# Patient Record
Sex: FEMALE | Race: WHITE | NOT HISPANIC OR LATINO | Employment: OTHER | URBAN - METROPOLITAN AREA
[De-identification: names, ages, dates, MRNs, and addresses within clinical notes are randomized per-mention and may not be internally consistent; named-entity substitution may affect disease eponyms.]

---

## 2018-05-23 ENCOUNTER — OFFICE VISIT (OUTPATIENT)
Dept: CARDIOLOGY | Facility: CLINIC | Age: 83
End: 2018-05-23

## 2018-05-23 VITALS
DIASTOLIC BLOOD PRESSURE: 72 MMHG | SYSTOLIC BLOOD PRESSURE: 124 MMHG | BODY MASS INDEX: 28.29 KG/M2 | HEIGHT: 69 IN | HEART RATE: 74 BPM | WEIGHT: 191 LBS

## 2018-05-23 DIAGNOSIS — I48.0 PAROXYSMAL ATRIAL FIBRILLATION (HCC): Primary | ICD-10-CM

## 2018-05-23 DIAGNOSIS — I10 ESSENTIAL HYPERTENSION: ICD-10-CM

## 2018-05-23 PROCEDURE — 93010 ELECTROCARDIOGRAM REPORT: CPT | Performed by: INTERNAL MEDICINE

## 2018-05-23 PROCEDURE — 99203 OFFICE O/P NEW LOW 30 MIN: CPT | Performed by: INTERNAL MEDICINE

## 2018-05-23 RX ORDER — LEVOTHYROXINE SODIUM 0.07 MG/1
75 TABLET ORAL DAILY
COMMUNITY

## 2018-05-23 RX ORDER — OMEPRAZOLE 20 MG/1
20 CAPSULE, DELAYED RELEASE ORAL DAILY
COMMUNITY

## 2018-05-23 RX ORDER — METOPROLOL SUCCINATE 25 MG/1
25 TABLET, EXTENDED RELEASE ORAL DAILY
COMMUNITY
End: 2019-01-11 | Stop reason: SDUPTHER

## 2018-05-23 RX ORDER — FUROSEMIDE 40 MG/1
40 TABLET ORAL DAILY
COMMUNITY
End: 2018-11-14 | Stop reason: SDUPTHER

## 2018-05-23 RX ORDER — ROSUVASTATIN CALCIUM 10 MG/1
10 TABLET, COATED ORAL DAILY
COMMUNITY

## 2018-05-23 NOTE — PROGRESS NOTES
Subjective:     Encounter Date:05/23/2018    Primary Care Physician: Rafia Keenan MD      Patient ID: Carolyn Pina is a 85 y.o. female.    Chief Complaint:Shortness of Breath (consult) and Irregular Heart Beat    PROBLEM LIST:  1. Paroxysmal atrial fibrillation  a. Diagnosed in 2016  b. CHADSVASC- 4, on chronic anticoagulation  c. Amiodarone pulmonary toxicity  2. Hypertension  3. Dyslipidemia  4. 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 On chronic oxygen 3-5L  5. Hypothyroidism  6. Arthritis  7. Surgeries:  a. Total hysterectomy  b. Bilateral cataract extraction  c. Neck lipoma removal.    No Known Allergies      Current Outpatient Prescriptions:   •  furosemide (LASIX) 40 MG tablet, Take 40 mg by mouth Daily., Disp: , Rfl:   •  levothyroxine (SYNTHROID, LEVOTHROID) 75 MCG tablet, Take 75 mcg by mouth Daily., Disp: , Rfl:   •  metoprolol succinate XL (TOPROL-XL) 25 MG 24 hr tablet, Take 25 mg by mouth Daily., Disp: , Rfl:   •  MIRTAZAPINE PO, Take  by mouth As Needed., Disp: , Rfl:   •  omeprazole (priLOSEC) 20 MG capsule, Take 20 mg by mouth Daily., Disp: , Rfl:   •  rivaroxaban (XARELTO) 20 MG tablet, Take 20 mg by mouth Daily., Disp: , Rfl:   •  rosuvastatin (CRESTOR) 10 MG tablet, Take 10 mg by mouth Daily., Disp: , Rfl:         History of Present Illness    Patient is an 85-year-old  female who we are seeing today first option of cardiac care secondary to history of paroxysmal atrial ablation.  She has history of this over the last few years but has had recurrent palpitations for quite some time.  She was previously seen by Dr. Sagar Metzger.  She was placed on amiodarone therapy for her paroxysmal atrial fibrillation and then subsequently developed pulmonary toxicity.  She is now followed by a pulmonologist and is on chronic oxygen therapy.  After this incident she now wishes change care.  She denies any chest pain, pressure, tightness.  She does note chronic shortness of breath.   "Denies any syncope, near-syncope, or edema.  Notes she is simply here first tablet [place] of care.    The following portions of the patient's history were reviewed and updated as appropriate: allergies, current medications, past family history, past medical history, past social history, past surgical history and problem list.    Family history: Reviewed and noncontributory    Social History   Substance Use Topics   • Smoking status: Never Smoker   • Smokeless tobacco: Never Used   • Alcohol use No         Review of Systems   Constitution: Negative for fever, weakness and malaise/fatigue.   HENT: Positive for hearing loss. Negative for nosebleeds.    Eyes: Negative for redness and visual disturbance.   Cardiovascular: Negative for orthopnea, palpitations and paroxysmal nocturnal dyspnea.   Respiratory: Positive for shortness of breath. Negative for cough, snoring, sputum production and wheezing.    Hematologic/Lymphatic: Negative for bleeding problem.   Skin: Negative for flushing, itching and rash.   Musculoskeletal: Positive for arthritis. Negative for falls, joint pain and muscle cramps.   Gastrointestinal: Positive for dysphagia. Negative for abdominal pain, diarrhea, heartburn, nausea and vomiting.   Genitourinary: Positive for urgency. Negative for hematuria.   Neurological: Negative for excessive daytime sleepiness, dizziness, headaches and tremors.   Psychiatric/Behavioral: Negative for substance abuse. The patient is not nervous/anxious.           Objective:    height is 175.3 cm (69\") and weight is 86.6 kg (191 lb). Her blood pressure is 124/72 and her pulse is 74.         Physical Exam   Constitutional: She is oriented to person, place, and time. She appears well-developed and well-nourished.   HENT:   Head: Normocephalic and atraumatic.   Mouth/Throat: Oropharynx is clear and moist.   Eyes: Conjunctivae are normal. Pupils are equal, round, and reactive to light.   Neck: Normal carotid pulses and no JVD " present. Carotid bruit is not present. No thyromegaly present.   Cardiovascular: Normal rate, regular rhythm, S1 normal and S2 normal.  Exam reveals no gallop and no friction rub.    No murmur heard.  Pulses:       Carotid pulses are 2+ on the right side, and 2+ on the left side.       Dorsalis pedis pulses are 2+ on the right side, and 2+ on the left side.        Posterior tibial pulses are 2+ on the right side, and 2+ on the left side.   Pulmonary/Chest: No respiratory distress. She has no wheezes. She has no rales. She exhibits no tenderness.   Abdominal: She exhibits no distension, no abdominal bruit and no mass. There is no hepatosplenomegaly. There is no tenderness. There is no rebound.   Musculoskeletal: She exhibits edema (1+ bilateral edema). She exhibits no tenderness or deformity.   Significant bilateral venous varicosities   Lymphadenopathy:     She has no cervical adenopathy.   Neurological: She is alert and oriented to person, place, and time. She has normal strength.   Skin: Skin is warm and dry. No rash noted. No cyanosis. Nails show no clubbing.   Psychiatric: She has a normal mood and affect. Cognition and memory are normal.         ECG 12 Lead  Date/Time: 5/23/2018 2:14 PM  Performed by: DIPTI HOLLINGSWORTH  Authorized by: DIPTI HOLLINGSWORTH   Rhythm: sinus rhythm  Clinical impression: normal ECG                  Assessment:   Assessment/Plan      Carolyn was seen today for shortness of breath and irregular heart beat.    Diagnoses and all orders for this visit:    Paroxysmal atrial fibrillation    Essential hypertension    Other orders  -     ECG 12 Lead      Patient with paroxysmal atrial fibrillation, 2 symptomatically episodes in the last 2 years.  Was present on amiodarone, however now has amiodarone pulmonary toxicity.  She is currently in sinus rhythm and is at her baseline.  I would recommend lifelong Xarelto (at low dose given her creatinine clearance of 41) discussed options of other medical  therapy/antiarrhythmics with the patient and .  I do not feel that antiarrhythmic is in her best interest given the rare brief nature of her episodes would simply continue her low-dose metoprolol.  In addition given her age, I'm not sure she requires a statin for primary preventative strategies.  I will have her discuss this with her primary care physician, to see if this medicine may be discontinued.       Polly PINEDA scribed portions of this dictation for  Dr. Beebe.  I, Sigifredo Beebe MD, personally performed the services described in this documentation as scribed by the above individual in my presence, and it is both accurate and complete  Dictated utilizing Dragon dictation

## 2018-11-14 ENCOUNTER — TELEPHONE (OUTPATIENT)
Dept: CARDIOLOGY | Facility: CLINIC | Age: 83
End: 2018-11-14

## 2018-11-14 RX ORDER — FUROSEMIDE 40 MG/1
40 TABLET ORAL DAILY
Qty: 90 TABLET | Refills: 2 | Status: SHIPPED | OUTPATIENT
Start: 2018-11-14 | End: 2021-10-20

## 2018-11-14 NOTE — TELEPHONE ENCOUNTER
Family called requesting refill for furosemide which previously was filled by Dr. Metzger.  Is that acceptable or should they get from PCP?

## 2019-01-11 RX ORDER — METOPROLOL SUCCINATE 25 MG/1
25 TABLET, EXTENDED RELEASE ORAL DAILY
Qty: 30 TABLET | Refills: 5 | Status: SHIPPED | OUTPATIENT
Start: 2019-01-11 | End: 2019-07-05 | Stop reason: SDUPTHER

## 2019-01-16 ENCOUNTER — TELEPHONE (OUTPATIENT)
Dept: CARDIOLOGY | Facility: CLINIC | Age: 84
End: 2019-01-16

## 2019-01-16 NOTE — TELEPHONE ENCOUNTER
Son called with concern that patient has been on metoprolol twice daily for years, since being seen by Dr. Metzger, cardiology.  Chart review shows external records have metoprolol bid, Epic chart shows once daily.

## 2019-01-16 NOTE — TELEPHONE ENCOUNTER
Please clarify also if she is on toprol or lopressor. If torpol this is typically prescribed once daily and as long as heart rate and BP are controlled would not change. If lopressor this should be BID.

## 2019-01-17 NOTE — TELEPHONE ENCOUNTER
If BP and HR ok and patient not having ill effects from not taking twice daily would stay on daily dose

## 2019-01-18 NOTE — TELEPHONE ENCOUNTER
Called son, he advised they figured it out, she was on the non extended release before so was BID, understands now is on extended release so will be daily

## 2019-06-05 ENCOUNTER — OFFICE VISIT (OUTPATIENT)
Dept: CARDIOLOGY | Facility: CLINIC | Age: 84
End: 2019-06-05

## 2019-06-05 VITALS
WEIGHT: 196 LBS | DIASTOLIC BLOOD PRESSURE: 66 MMHG | OXYGEN SATURATION: 98 % | HEART RATE: 81 BPM | HEIGHT: 69 IN | SYSTOLIC BLOOD PRESSURE: 140 MMHG | BODY MASS INDEX: 29.03 KG/M2

## 2019-06-05 DIAGNOSIS — I48.0 PAROXYSMAL ATRIAL FIBRILLATION (HCC): Primary | ICD-10-CM

## 2019-06-05 DIAGNOSIS — I10 ESSENTIAL HYPERTENSION: ICD-10-CM

## 2019-06-05 PROCEDURE — 99213 OFFICE O/P EST LOW 20 MIN: CPT | Performed by: NURSE PRACTITIONER

## 2019-06-05 NOTE — PROGRESS NOTES
Subjective:     Encounter Date:06/05/2019    Primary Care Physician: Rafia Keenan MD      Patient ID: Carolyn Pina is a 86 y.o. female.    Chief Complaint:Follow-up    PROBLEM LIST:  1. Paroxysmal atrial fibrillation  a. Diagnosed in 2016  b. CHADSVASC- 4, on chronic anticoagulation  c. Amiodarone pulmonary toxicity  2. Hypertension  3. Dyslipidemia  4. 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 On chronic oxygen 3-5L  5. Hypothyroidism  6. Arthritis  7. Surgeries:  a. Total hysterectomy  b. Bilateral cataract extraction  c. Neck lipoma removal.      Allergies   Allergen Reactions   • Amiodarone Shortness Of Breath     Lung Damage         Current Outpatient Medications:   •  furosemide (LASIX) 40 MG tablet, Take 1 tablet by mouth Daily., Disp: 90 tablet, Rfl: 2  •  levothyroxine (SYNTHROID, LEVOTHROID) 75 MCG tablet, Take 75 mcg by mouth Daily., Disp: , Rfl:   •  metoprolol succinate XL (TOPROL-XL) 25 MG 24 hr tablet, Take 1 tablet by mouth Daily., Disp: 30 tablet, Rfl: 5  •  MIRTAZAPINE PO, Take  by mouth As Needed., Disp: , Rfl:   •  omeprazole (priLOSEC) 20 MG capsule, Take 20 mg by mouth Daily., Disp: , Rfl:   •  rivaroxaban (XARELTO) 20 MG tablet, Take 1 tablet by mouth Daily., Disp: 90 tablet, Rfl: 2  •  rosuvastatin (CRESTOR) 10 MG tablet, Take 10 mg by mouth Daily., Disp: , Rfl:         History of Present Illness    Patient returns today for annual follow-up of paroxysmal atrial fibrillation.  Since last being seen she notes to overall be doing well.  She has not had any tachypalpitations similar to her previous atrial fibrillation.  She denies any chest pain, pressure, tightness.  She denies any increased shortness of breath.  No reported syncope, near-syncope, or edema.  Notes that she has been compliant with her anticoagulation.  Denies any current bleeding issues.  Notes that she had blood work done with her primary care physician roughly 2 weeks ago.    The following portions of the  "patient's history were reviewed and updated as appropriate: allergies, current medications, past family history, past medical history, past social history, past surgical history and problem list.      Social History     Tobacco Use   • Smoking status: Never Smoker   • Smokeless tobacco: Never Used   Substance Use Topics   • Alcohol use: No   • Drug use: No         Review of Systems   Constitution: Positive for weakness.   HENT: Positive for hearing loss.    Cardiovascular: Negative.    Respiratory: Negative.    Hematologic/Lymphatic: Negative for bleeding problem. Does not bruise/bleed easily.   Skin: Negative for rash.   Musculoskeletal: Positive for arthritis. Negative for muscle weakness and myalgias.   Gastrointestinal: Negative for heartburn, nausea and vomiting.          Objective:    height is 175.3 cm (69\") and weight is 88.9 kg (196 lb). Her blood pressure is 140/66 and her pulse is 81. Her oxygen saturation is 98%.         Physical Exam   Constitutional: She is oriented to person, place, and time. She appears well-developed and well-nourished.   Neck: No JVD present. No tracheal deviation present.   Cardiovascular: Normal rate, regular rhythm, normal heart sounds and intact distal pulses. Exam reveals no friction rub.   No murmur heard.  Pulmonary/Chest: Effort normal and breath sounds normal. No respiratory distress.   Abdominal: Soft. Bowel sounds are normal. There is no tenderness.   Musculoskeletal: She exhibits deformity (Mild to moderate kyphosis of spine). She exhibits no edema.   Neurological: She is alert and oriented to person, place, and time.   Skin: Skin is warm and dry.       Procedures          Assessment:   Assessment/Plan      Carolyn was seen today for follow-up.    Diagnoses and all orders for this visit:    Paroxysmal atrial fibrillation (CMS/HCC) stable.  On chronic anticoagulation.    Essential hypertension, controlled.      Plan:  1. Continue Xarelto for anticoagulation purposes of " atrial fibrillation as well as beta-blockade for rate control.  2. Obtain lab work from primary care physician to evaluate Xarelto dosing.  May need to be on 15 mg if GFR less than 50.  3. Continue beta-blocker for hypertension as well.  4. Will contact patient regarding Xarelto dosing after labs reviewed.  5. Follow-up in 1 year's time or sooner if needed.       MIRIAM Llanos     Dictated utilizing Dragon dictation

## 2019-06-06 ENCOUNTER — TELEPHONE (OUTPATIENT)
Dept: CARDIOLOGY | Facility: CLINIC | Age: 84
End: 2019-06-06

## 2019-06-19 ENCOUNTER — TELEPHONE (OUTPATIENT)
Dept: CARDIOLOGY | Facility: CLINIC | Age: 84
End: 2019-06-19

## 2019-06-19 NOTE — TELEPHONE ENCOUNTER
Spoke with son, aroldo Borja has reviewed labs and for patient to remain on current dose of Xarelto 20 mg.   Understanding and agreement verbalized.

## 2019-07-05 RX ORDER — METOPROLOL SUCCINATE 25 MG/1
TABLET, EXTENDED RELEASE ORAL
Qty: 90 TABLET | Refills: 3 | Status: SHIPPED | OUTPATIENT
Start: 2019-07-05 | End: 2020-07-06

## 2019-07-18 RX ORDER — RIVAROXABAN 20 MG/1
TABLET, FILM COATED ORAL
Qty: 90 TABLET | Refills: 2 | Status: SHIPPED | OUTPATIENT
Start: 2019-07-18 | End: 2020-04-09

## 2019-09-03 ENCOUNTER — OFFICE VISIT (OUTPATIENT)
Dept: CARDIOLOGY | Facility: CLINIC | Age: 84
End: 2019-09-03

## 2019-09-03 VITALS
DIASTOLIC BLOOD PRESSURE: 70 MMHG | HEIGHT: 69 IN | OXYGEN SATURATION: 97 % | SYSTOLIC BLOOD PRESSURE: 144 MMHG | HEART RATE: 78 BPM | WEIGHT: 193 LBS | BODY MASS INDEX: 28.58 KG/M2

## 2019-09-03 DIAGNOSIS — E78.00 PURE HYPERCHOLESTEROLEMIA: ICD-10-CM

## 2019-09-03 DIAGNOSIS — R07.2 PRECORDIAL PAIN: Primary | ICD-10-CM

## 2019-09-03 DIAGNOSIS — I10 ESSENTIAL HYPERTENSION: ICD-10-CM

## 2019-09-03 DIAGNOSIS — I48.0 PAROXYSMAL ATRIAL FIBRILLATION (HCC): ICD-10-CM

## 2019-09-03 PROCEDURE — 99213 OFFICE O/P EST LOW 20 MIN: CPT | Performed by: INTERNAL MEDICINE

## 2019-09-03 RX ORDER — HYDROCODONE BITARTRATE AND ACETAMINOPHEN 5; 325 MG/1; MG/1
1 TABLET ORAL DAILY PRN
Refills: 0 | COMMUNITY
Start: 2019-08-19 | End: 2020-11-04

## 2019-09-03 RX ORDER — NITROGLYCERIN 0.4 MG/1
0.4 TABLET SUBLINGUAL
Refills: 0 | COMMUNITY
Start: 2019-08-09

## 2019-09-03 RX ORDER — GLYCOPYRROLATE 25 UG/ML
SOLUTION RESPIRATORY (INHALATION) 2 TIMES DAILY
Refills: 12 | COMMUNITY
Start: 2019-07-25

## 2019-12-23 RX ORDER — FUROSEMIDE 40 MG/1
TABLET ORAL
Qty: 90 TABLET | Refills: 0 | OUTPATIENT
Start: 2019-12-23

## 2020-04-09 RX ORDER — RIVAROXABAN 20 MG/1
TABLET, FILM COATED ORAL
Qty: 90 TABLET | Refills: 0 | Status: SHIPPED | OUTPATIENT
Start: 2020-04-09 | End: 2020-07-20

## 2020-07-06 RX ORDER — METOPROLOL SUCCINATE 25 MG/1
TABLET, EXTENDED RELEASE ORAL
Qty: 90 TABLET | Refills: 0 | Status: SHIPPED | OUTPATIENT
Start: 2020-07-06 | End: 2020-09-30

## 2020-07-20 RX ORDER — RIVAROXABAN 20 MG/1
TABLET, FILM COATED ORAL
Qty: 90 TABLET | Refills: 0 | Status: SHIPPED | OUTPATIENT
Start: 2020-07-20 | End: 2020-10-22

## 2020-09-30 RX ORDER — METOPROLOL SUCCINATE 25 MG/1
TABLET, EXTENDED RELEASE ORAL
Qty: 90 TABLET | Refills: 3 | Status: SHIPPED | OUTPATIENT
Start: 2020-09-30 | End: 2021-09-24

## 2020-10-22 RX ORDER — RIVAROXABAN 20 MG/1
TABLET, FILM COATED ORAL
Qty: 90 TABLET | Refills: 0 | Status: SHIPPED | OUTPATIENT
Start: 2020-10-22 | End: 2020-11-04 | Stop reason: SDUPTHER

## 2020-11-04 ENCOUNTER — OFFICE VISIT (OUTPATIENT)
Dept: CARDIOLOGY | Facility: CLINIC | Age: 85
End: 2020-11-04

## 2020-11-04 VITALS
HEART RATE: 77 BPM | SYSTOLIC BLOOD PRESSURE: 162 MMHG | WEIGHT: 190 LBS | HEIGHT: 69 IN | OXYGEN SATURATION: 96 % | BODY MASS INDEX: 28.14 KG/M2 | DIASTOLIC BLOOD PRESSURE: 70 MMHG

## 2020-11-04 DIAGNOSIS — I48.0 PAROXYSMAL ATRIAL FIBRILLATION (HCC): Primary | ICD-10-CM

## 2020-11-04 DIAGNOSIS — I10 ESSENTIAL HYPERTENSION: ICD-10-CM

## 2020-11-04 DIAGNOSIS — E78.00 PURE HYPERCHOLESTEROLEMIA: ICD-10-CM

## 2020-11-04 PROCEDURE — 99213 OFFICE O/P EST LOW 20 MIN: CPT | Performed by: INTERNAL MEDICINE

## 2020-11-04 NOTE — PROGRESS NOTES
Subjective:     Encounter Date:11/04/2020    Primary Care Physician: Rafia Keenan MD      Patient ID: Carolyn Pina is a 87 y.o. female.    Chief Complaint:Follow-up    PROBLEM LIST:  1. Paroxysmal atrial fibrillation  a. Diagnosed in 2016  b. CHADSVASC- 4, on chronic anticoagulation  c. Amiodarone pulmonary toxicity  2. Hypertension  3. Dyslipidemia  4. 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 On chronic oxygen 3-5L  5. Hypothyroidism  6. Rheumatoid arthritis  7. Surgeries:  a. Total hysterectomy  b. Bilateral cataract extraction  c. Neck lipoma removal.      Allergies   Allergen Reactions   • Amiodarone Shortness Of Breath     Lung Damage         Current Outpatient Medications:   •  furosemide (LASIX) 40 MG tablet, Take 1 tablet by mouth Daily., Disp: 90 tablet, Rfl: 2  •  levothyroxine (SYNTHROID, LEVOTHROID) 75 MCG tablet, Take 75 mcg by mouth Daily., Disp: , Rfl:   •  LONHALA MAGNAIR REFILL KIT 25 MCG/ML solution, 2 (Two) Times a Day. as directed, Disp: , Rfl: 12  •  metoprolol succinate XL (TOPROL-XL) 25 MG 24 hr tablet, Take 1 tablet by mouth once daily, Disp: 90 tablet, Rfl: 3  •  MIRTAZAPINE PO, Take  by mouth As Needed., Disp: , Rfl:   •  nitroglycerin (NITROSTAT) 0.4 MG SL tablet, Place 0.4 mg under the tongue Every 5 (Five) Minutes As Needed. do not exceed a total of 3 doses in 15 minutes, Disp: , Rfl: 0  •  omeprazole (priLOSEC) 20 MG capsule, Take 20 mg by mouth Daily., Disp: , Rfl:   •  rosuvastatin (CRESTOR) 10 MG tablet, Take 10 mg by mouth Daily., Disp: , Rfl:   •  Xarelto 20 MG tablet, Take 1 tablet by mouth once daily, Disp: 90 tablet, Rfl: 0        History of Present Illness    Patient returns today for annual follow-up of atrial fibrillation and hypertension.  Since her last visit she notes from a cardiovascular standpoint she is doing very well.  She has been started on prednisone for rheumatoid arthritis due to significant joint discomfort, notes being on this that she  "feels much better and has been much more active.  She still has some dyspnea on exertion.  Notes some mild edema since being on this.  Thinks her blood pressures have been mildly elevated since he been on the prednisone.  She is in the midst of tapering off this at this time.  No tachypalpitations or A. fib.    The following portions of the patient's history were reviewed and updated as appropriate: allergies, current medications, past family history, past medical history, past social history, past surgical history and problem list.      Social History     Tobacco Use   • Smoking status: Never Smoker   • Smokeless tobacco: Never Used   Substance Use Topics   • Alcohol use: No   • Drug use: No         Review of Systems   Constitution: Positive for chills.   HENT: Positive for ear pain and hearing loss.    Eyes: Positive for blurred vision and visual disturbance.   Cardiovascular: Negative for chest pain, dyspnea on exertion, leg swelling, palpitations and syncope.   Respiratory: Positive for snoring. Negative for shortness of breath.    Hematologic/Lymphatic: Negative for bleeding problem. Does not bruise/bleed easily.   Skin: Negative for rash.   Musculoskeletal: Positive for arthritis, joint pain, joint swelling and stiffness. Negative for muscle weakness and myalgias.   Gastrointestinal: Positive for change in bowel habit and constipation. Negative for heartburn, nausea and vomiting.   Genitourinary: Positive for frequency.   Neurological: Negative for dizziness, light-headedness, loss of balance and numbness.          Objective:   /70 (BP Location: Left arm)   Pulse 77   Ht 175.3 cm (69\")   Wt 86.2 kg (190 lb)   SpO2 96%   BMI 28.06 kg/m²         Vitals signs reviewed.   Constitutional:       Appearance: Well-developed and not in distress.   Neck:      Thyroid: No thyromegaly.      Vascular: No carotid bruit or JVD.   Pulmonary:      Breath sounds: Normal breath sounds.   Cardiovascular:      Regular " rhythm.      No gallop. No S3 and S4 gallop.   Abdominal:      General: Bowel sounds are normal.      Palpations: Abdomen is soft. There is no abdominal mass.      Tenderness: There is no abdominal tenderness.   Musculoskeletal:         General: No deformity.      Extremities: No clubbing present.  Skin:     General: Skin is warm and dry.      Findings: No rash.   Neurological:      Mental Status: Alert and oriented to person, place, and time.         Procedures          Assessment:   Assessment/Plan      Diagnoses and all orders for this visit:    1. Paroxysmal atrial fibrillation (CMS/HCC) (Primary)    2. Essential hypertension    3. Pure hypercholesterolemia      1.  Paroxysmal atrial fibrillation.  No documented recurrence in quite some time.  Currently asymptomatic.  On chronic anticoagulation.  2.  Amiodarone lung toxicity.  Is now off her oxygen during the daytime only needs it at nighttime.  Hypertension elevated due to recent steroid/prednisone usage  4.  Dyslipidemia currently well controlled    Recommendations   1.  Continue current medical therapy  2.  Follow-up blood pressure with primary physician once off steroid taper.  2.  Revisit on an as-needed basis       Sigifredo Beebe MD      Dictated utilizing Dragon dictation

## 2021-08-03 ENCOUNTER — OFFICE VISIT (OUTPATIENT)
Dept: CARDIOLOGY | Facility: CLINIC | Age: 86
End: 2021-08-03

## 2021-08-03 VITALS
HEIGHT: 69 IN | OXYGEN SATURATION: 98 % | BODY MASS INDEX: 29.47 KG/M2 | HEART RATE: 67 BPM | WEIGHT: 199 LBS | DIASTOLIC BLOOD PRESSURE: 70 MMHG | SYSTOLIC BLOOD PRESSURE: 146 MMHG

## 2021-08-03 DIAGNOSIS — I48.0 PAROXYSMAL ATRIAL FIBRILLATION (HCC): Primary | ICD-10-CM

## 2021-08-03 DIAGNOSIS — I10 ESSENTIAL HYPERTENSION: ICD-10-CM

## 2021-08-03 DIAGNOSIS — R60.0 LOCALIZED EDEMA: ICD-10-CM

## 2021-08-03 PROCEDURE — 99214 OFFICE O/P EST MOD 30 MIN: CPT | Performed by: NURSE PRACTITIONER

## 2021-08-03 RX ORDER — METOLAZONE 2.5 MG/1
2.5 TABLET ORAL DAILY
Qty: 2 TABLET | Refills: 0 | Status: SHIPPED | OUTPATIENT
Start: 2021-08-03

## 2021-08-03 NOTE — PROGRESS NOTES
Subjective:     Encounter Date:08/03/2021    Primary Care Physician: Rafia Keenan MD      Patient ID: Carolyn Pina is a 88 y.o. female.    Chief Complaint:Follow-up and Edema (legs/feet)    PROBLEM LIST:  1. Paroxysmal atrial fibrillation  a. Diagnosed in 2016  b. CHADSVASC- 4, on chronic anticoagulation  c. Amiodarone pulmonary toxicity  2. Hypertension  3. Dyslipidemia  4. 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 On nocturnal oxygen  5. Hypothyroidism  6. Rheumatoid arthritis  7. Surgeries:  a. Total hysterectomy  b. Bilateral cataract extraction  c. Neck lipoma removal.      Allergies   Allergen Reactions   • Amiodarone Shortness Of Breath     Lung Damage         Current Outpatient Medications:   •  furosemide (LASIX) 40 MG tablet, Take 1 tablet by mouth Daily., Disp: 90 tablet, Rfl: 2  •  levothyroxine (SYNTHROID, LEVOTHROID) 75 MCG tablet, Take 75 mcg by mouth Daily., Disp: , Rfl:   •  LONHALA MAGNAIR REFILL KIT 25 MCG/ML solution, 2 (Two) Times a Day. as directed, Disp: , Rfl: 12  •  metoprolol succinate XL (TOPROL-XL) 25 MG 24 hr tablet, Take 1 tablet by mouth once daily, Disp: 90 tablet, Rfl: 3  •  MIRTAZAPINE PO, Take  by mouth As Needed., Disp: , Rfl:   •  nitroglycerin (NITROSTAT) 0.4 MG SL tablet, Place 0.4 mg under the tongue Every 5 (Five) Minutes As Needed. do not exceed a total of 3 doses in 15 minutes, Disp: , Rfl: 0  •  omeprazole (priLOSEC) 20 MG capsule, Take 20 mg by mouth Daily., Disp: , Rfl:   •  rivaroxaban (Xarelto) 20 MG tablet, Take 1 tablet by mouth Daily., Disp: 90 tablet, Rfl: 3  •  rosuvastatin (CRESTOR) 10 MG tablet, Take 10 mg by mouth Daily., Disp: , Rfl:         History of Present Illness    Patient is an 88-year-old  female who is being seen today for further evaluation of lower extremity edema.  She has previous history of paroxysmal atrial fibrillation and amiodarone pulmonary toxicity as well as hypertension.  She is no longer on 24/7 oxygen.  She  now only needs this at night.  She denies any significant chest pain, pressure.  Denies any increase shortness of breath but has chronic shortness of breath with exertion.  No reported syncope or near syncope.  Patient son reports that her edema seemed to significantly worsen about 2 months ago whenever she was placed on high-dose prednisone (40 mg).  She then developed lower extremity cellulitis which was treated with antibiotics.  Since that time she is still had some persistent edema which she notes to be uncomfortable.  She is unable to wear her usual shoes related to this.  Patient reports that she drinks at least 3 12 ounce glasses of water per day with her meals as well as at least probably 2 other glasses throughout the day.  Does not feel that her Lasix is working.  Does not add any extra salt to her food.  Does not note any significant difference with elevation of her extremities.    The following portions of the patient's history were reviewed and updated as appropriate: allergies, current medications, past family history, past medical history, past social history, past surgical history and problem list.      Social History     Tobacco Use   • Smoking status: Never Smoker   • Smokeless tobacco: Never Used   Substance Use Topics   • Alcohol use: No   • Drug use: No         Review of Systems   Constitutional: Positive for malaise/fatigue.   Cardiovascular: Positive for leg swelling. Negative for chest pain, dyspnea on exertion, palpitations and syncope.   Respiratory: Positive for shortness of breath.    Hematologic/Lymphatic: Negative for bleeding problem. Does not bruise/bleed easily.   Skin: Negative for rash.   Musculoskeletal: Positive for arthritis and joint pain. Negative for muscle weakness and myalgias.   Gastrointestinal: Negative for heartburn, nausea and vomiting.   Neurological: Negative for dizziness, light-headedness, loss of balance and numbness.          Objective:   /70   Pulse 67   " Ht 175.3 cm (69\")   Wt 90.3 kg (199 lb)   SpO2 98%   BMI 29.39 kg/m²         Vitals reviewed.   Constitutional:       Appearance: Well-developed and not in distress. Frail.      Comments: In wheelchair   Neck:      Vascular: No JVD.      Trachea: No tracheal deviation.   Pulmonary:      Effort: Pulmonary effort is normal.   Cardiovascular:      Normal rate.   Edema:     Pretibial: bilateral 1+ edema of the pretibial area.     Ankle: bilateral 1+ edema of the ankle.  Abdominal:      General: Bowel sounds are normal.      Palpations: Abdomen is soft.      Tenderness: There is no abdominal tenderness.   Musculoskeletal:         General: No deformity. Skin:     General: Skin is warm and dry.   Neurological:      Mental Status: Alert and oriented to person, place, and time.         Procedures          Assessment:   Assessment/Plan      Diagnoses and all orders for this visit:    1. Paroxysmal atrial fibrillation (CMS/HCC) (Primary), stable.  Chronically anticoagulated on Xarelto.  Most recent GFR of 44.    2. Localized edema, significant volume intake.  Currently on diuretics.  No recent echo.    3. Essential hypertension, stable.  On beta-blocker.    Other orders  -     rivaroxaban (Xarelto) 15 MG tablet; Take 1 tablet by mouth Daily With Dinner.  Dispense: 30 tablet; Refill: 11  -     metOLazone (ZAROXOLYN) 2.5 MG tablet; Take 1 tablet by mouth Daily.  Dispense: 2 tablet; Refill: 0      Plan:  1. Given GFR of less than 50 will decrease Xarelto to 15 mg daily.  Given samples in the office today.  2. We will give patient dose of metolazone 2.5 mg to take for the next 2 days to help with her edema.  3. Continue other current cardiac medications.  4. Check echocardiogram in the near term.  5. Discussed with patient and son today need for volume restriction.  6. Follow-up in 6 weeks time or sooner if needed.       Polly PINEDA     Dictated utilizing Dragon dictation  "

## 2021-09-24 RX ORDER — METOPROLOL SUCCINATE 25 MG/1
TABLET, EXTENDED RELEASE ORAL
Qty: 90 TABLET | Refills: 0 | Status: SHIPPED | OUTPATIENT
Start: 2021-09-24 | End: 2021-12-29

## 2021-10-19 NOTE — PROGRESS NOTES
Subjective:     Encounter Date:10/20/2021    Primary Care Physician: Rafia Keenan MD      Patient ID: Carolyn Pina is a 88 y.o. female.    Chief Complaint:Follow-up    PROBLEM LIST:  1. Paroxysmal atrial fibrillation  a. Diagnosed in 2016  b. CHADSVASC- 4, on chronic anticoagulation  c. Amiodarone pulmonary toxicity  2. Hypertension  3. Dyslipidemia  4. 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 On nocturnal oxygen  5. Hypothyroidism  6. Rheumatoid arthritis  7. Surgeries:  a. Total hysterectomy  b. Bilateral cataract extraction  c. Neck lipoma removal.      Allergies   Allergen Reactions   • Amiodarone Shortness Of Breath     Lung Damage         Current Outpatient Medications:   •  bumetanide (BUMEX) 2 MG tablet, Take 2 mg by mouth Daily., Disp: , Rfl:   •  levothyroxine (SYNTHROID, LEVOTHROID) 75 MCG tablet, Take 75 mcg by mouth Daily., Disp: , Rfl:   •  levothyroxine (SYNTHROID, LEVOTHROID) 75 MCG tablet, Take 75 mcg by mouth Daily., Disp: , Rfl:   •  LONHALA MAGNAIR REFILL KIT 25 MCG/ML solution, 2 (Two) Times a Day. as directed, Disp: , Rfl: 12  •  metOLazone (ZAROXOLYN) 2.5 MG tablet, Take 1 tablet by mouth Daily., Disp: 2 tablet, Rfl: 0  •  metoprolol succinate XL (TOPROL-XL) 25 MG 24 hr tablet, Take 1 tablet by mouth once daily, Disp: 90 tablet, Rfl: 0  •  MIRTAZAPINE PO, Take  by mouth As Needed., Disp: , Rfl:   •  nitroglycerin (NITROSTAT) 0.4 MG SL tablet, Place 0.4 mg under the tongue Every 5 (Five) Minutes As Needed. do not exceed a total of 3 doses in 15 minutes, Disp: , Rfl: 0  •  omeprazole (priLOSEC) 20 MG capsule, Take 20 mg by mouth Daily., Disp: , Rfl:   •  predniSONE (DELTASONE) 5 MG tablet, Take 5 mg by mouth Daily., Disp: , Rfl:   •  rivaroxaban (Xarelto) 15 MG tablet, Take 1 tablet by mouth Daily With Dinner., Disp: 30 tablet, Rfl: 11  •  rosuvastatin (CRESTOR) 10 MG tablet, Take 10 mg by mouth Daily., Disp: , Rfl:         History of Present Illness    Patient returns for  "6-week follow-up for reevaluation of lower extremity edema.  Since last being seen patient has been very compliant with her compression stockings.  Has overall noted that her edema is improved.  She has cut back on her volume intake.  Denies any increase shortness of breath or chest pain.  Has not had any recent lab work performed.  Notes is now on Bumex.    The following portions of the patient's history were reviewed and updated as appropriate: allergies, current medications, past family history, past medical history, past social history, past surgical history and problem list.      Social History     Tobacco Use   • Smoking status: Never Smoker   • Smokeless tobacco: Never Used   Substance Use Topics   • Alcohol use: No   • Drug use: No         Review of Systems   Constitutional: Positive for malaise/fatigue.   Cardiovascular: Positive for leg swelling. Negative for chest pain, dyspnea on exertion, palpitations and syncope.   Respiratory: Negative.  Negative for shortness of breath.    Hematologic/Lymphatic: Negative for bleeding problem. Does not bruise/bleed easily.   Skin: Negative for rash.   Musculoskeletal: Positive for arthritis, back pain and joint pain. Negative for muscle weakness and myalgias.   Gastrointestinal: Negative for heartburn, nausea and vomiting.   Neurological: Negative for dizziness, light-headedness, loss of balance and numbness.          Objective:   /70   Pulse 68   Ht 175.3 cm (69\")   Wt 86.6 kg (191 lb)   SpO2 98%   BMI 28.21 kg/m²         Vitals reviewed.   Constitutional:       Appearance: Well-developed and not in distress. Frail.      Comments: In wheelchair   Neck:      Vascular: No JVD.      Trachea: No tracheal deviation.   Pulmonary:      Effort: Pulmonary effort is normal.      Breath sounds: Normal breath sounds.   Cardiovascular:      Normal rate.      Comments: Bilateral venous varicosities  Edema:     Ankle: 1+ edema of the left ankle and trace edema of the " right ankle.  Abdominal:      General: Bowel sounds are normal.      Palpations: Abdomen is soft.      Tenderness: There is no abdominal tenderness.   Musculoskeletal:         General: No deformity. Skin:     General: Skin is warm and dry.   Neurological:      Mental Status: Alert and oriented to person, place, and time.         Procedures          Assessment:   Assessment/Plan      Diagnoses and all orders for this visit:    1. Localized edema (Primary), improved.  Patient using compression stockings, decreased volume intake.  On Bumex.    2. Essential hypertension, stable.  On beta-blocker.    3. Paroxysmal atrial fibrillation (HCC), chronically anticoagulated with Xarelto.      Plan:  1. Encouraged current volume restriction.  2. Encouraged continued use of compression stockings given her noted significant venous varicosities.  3. Continue current cardiac medications.  4. Encourage patient to get blood work to assess renal function and potassium levels with primary care within the next few weeks.  5. Follow-up in 1 year's time or sooner if needed.       MIRIAM Llanos scribed this dictation for Dr. Sigifredo Beebe.      I have seen and examined the patient, I have reviewed the note, discussed the case with the advance practice clinician, made necessary changes and I agree with the final note.    Sigifredo Beebe MD  10/20/21  15:39 EDT        Dictated utilizing Dragon dictation

## 2021-10-20 ENCOUNTER — OFFICE VISIT (OUTPATIENT)
Dept: CARDIOLOGY | Facility: CLINIC | Age: 86
End: 2021-10-20

## 2021-10-20 VITALS
WEIGHT: 191 LBS | SYSTOLIC BLOOD PRESSURE: 140 MMHG | HEART RATE: 68 BPM | OXYGEN SATURATION: 98 % | DIASTOLIC BLOOD PRESSURE: 70 MMHG | BODY MASS INDEX: 28.29 KG/M2 | HEIGHT: 69 IN

## 2021-10-20 DIAGNOSIS — I48.0 PAROXYSMAL ATRIAL FIBRILLATION (HCC): ICD-10-CM

## 2021-10-20 DIAGNOSIS — I10 ESSENTIAL HYPERTENSION: ICD-10-CM

## 2021-10-20 DIAGNOSIS — R60.0 LOCALIZED EDEMA: Primary | ICD-10-CM

## 2021-10-20 PROCEDURE — 99213 OFFICE O/P EST LOW 20 MIN: CPT | Performed by: INTERNAL MEDICINE

## 2021-10-20 RX ORDER — LEVOTHYROXINE SODIUM 0.07 MG/1
75 TABLET ORAL DAILY
COMMUNITY

## 2021-10-20 RX ORDER — PREDNISONE 1 MG/1
5 TABLET ORAL DAILY
COMMUNITY

## 2021-10-20 RX ORDER — BUMETANIDE 2 MG/1
2 TABLET ORAL DAILY
COMMUNITY

## 2021-11-03 ENCOUNTER — HOSPITAL ENCOUNTER (OUTPATIENT)
Dept: CARDIOLOGY | Facility: HOSPITAL | Age: 86
Discharge: HOME | End: 2021-11-03
Admitting: NURSE PRACTITIONER

## 2021-11-03 VITALS — WEIGHT: 191 LBS | BODY MASS INDEX: 28.29 KG/M2 | HEIGHT: 69 IN

## 2021-11-03 DIAGNOSIS — R60.0 LOCALIZED EDEMA: ICD-10-CM

## 2021-11-03 PROCEDURE — 93306 TTE W/DOPPLER COMPLETE: CPT | Performed by: INTERNAL MEDICINE

## 2021-11-03 PROCEDURE — 93306 TTE W/DOPPLER COMPLETE: CPT

## 2021-11-04 LAB
BH CV ECHO MEAS - AI DEC SLOPE: 288.1 CM/SEC^2
BH CV ECHO MEAS - AI MAX PG: 101.3 MMHG
BH CV ECHO MEAS - AI MAX VEL: 503.3 CM/SEC
BH CV ECHO MEAS - AI P1/2T: 511.7 MSEC
BH CV ECHO MEAS - AO MAX PG (FULL): 4.3 MMHG
BH CV ECHO MEAS - AO MAX PG: 10.5 MMHG
BH CV ECHO MEAS - AO MEAN PG (FULL): 2.3 MMHG
BH CV ECHO MEAS - AO MEAN PG: 5.1 MMHG
BH CV ECHO MEAS - AO ROOT AREA (BSA CORRECTED): 1.6
BH CV ECHO MEAS - AO ROOT AREA: 7.8 CM^2
BH CV ECHO MEAS - AO ROOT DIAM: 3.1 CM
BH CV ECHO MEAS - AO V2 MAX: 161.9 CM/SEC
BH CV ECHO MEAS - AO V2 MEAN: 104.4 CM/SEC
BH CV ECHO MEAS - AO V2 VTI: 34.9 CM
BH CV ECHO MEAS - ASC AORTA: 3.4 CM
BH CV ECHO MEAS - AVA(I,A): 3.1 CM^2
BH CV ECHO MEAS - AVA(I,D): 3.1 CM^2
BH CV ECHO MEAS - AVA(V,A): 2.5 CM^2
BH CV ECHO MEAS - AVA(V,D): 2.5 CM^2
BH CV ECHO MEAS - BSA(HAYCOCK): 2.1 M^2
BH CV ECHO MEAS - BSA: 2 M^2
BH CV ECHO MEAS - BZI_BMI: 28.2 KILOGRAMS/M^2
BH CV ECHO MEAS - BZI_METRIC_HEIGHT: 175.3 CM
BH CV ECHO MEAS - BZI_METRIC_WEIGHT: 86.6 KG
BH CV ECHO MEAS - EDV(CUBED): 69.5 ML
BH CV ECHO MEAS - EDV(MOD-SP2): 104 ML
BH CV ECHO MEAS - EDV(MOD-SP4): 133 ML
BH CV ECHO MEAS - EDV(TEICH): 74.7 ML
BH CV ECHO MEAS - EF(CUBED): 58.6 %
BH CV ECHO MEAS - EF(MOD-BP): 64 %
BH CV ECHO MEAS - EF(MOD-SP2): 57.7 %
BH CV ECHO MEAS - EF(MOD-SP4): 72.9 %
BH CV ECHO MEAS - EF(TEICH): 50.7 %
BH CV ECHO MEAS - ESV(CUBED): 28.7 ML
BH CV ECHO MEAS - ESV(MOD-SP2): 44 ML
BH CV ECHO MEAS - ESV(MOD-SP4): 36 ML
BH CV ECHO MEAS - ESV(TEICH): 36.8 ML
BH CV ECHO MEAS - FS: 25.5 %
BH CV ECHO MEAS - IVS/LVPW: 1.1
BH CV ECHO MEAS - IVSD: 1.7 CM
BH CV ECHO MEAS - LA DIMENSION: 4.3 CM
BH CV ECHO MEAS - LA/AO: 1.4
BH CV ECHO MEAS - LAD MAJOR: 5.7 CM
BH CV ECHO MEAS - LAT PEAK E' VEL: 3.6 CM/SEC
BH CV ECHO MEAS - LATERAL E/E' RATIO: 26.8
BH CV ECHO MEAS - LV DIASTOLIC VOL/BSA (35-75): 65.7 ML/M^2
BH CV ECHO MEAS - LV IVRT: 0.07 SEC
BH CV ECHO MEAS - LV MASS(C)D: 261.2 GRAMS
BH CV ECHO MEAS - LV MASS(C)DI: 129 GRAMS/M^2
BH CV ECHO MEAS - LV MAX PG: 6.1 MMHG
BH CV ECHO MEAS - LV MEAN PG: 2.8 MMHG
BH CV ECHO MEAS - LV SYSTOLIC VOL/BSA (12-30): 17.8 ML/M^2
BH CV ECHO MEAS - LV V1 MAX: 123.9 CM/SEC
BH CV ECHO MEAS - LV V1 MEAN: 77 CM/SEC
BH CV ECHO MEAS - LV V1 VTI: 34.2 CM
BH CV ECHO MEAS - LVIDD: 4.1 CM
BH CV ECHO MEAS - LVIDS: 3.1 CM
BH CV ECHO MEAS - LVLD AP2: 6.9 CM
BH CV ECHO MEAS - LVLD AP4: 7.3 CM
BH CV ECHO MEAS - LVLS AP2: 6 CM
BH CV ECHO MEAS - LVLS AP4: 5.2 CM
BH CV ECHO MEAS - LVOT AREA (M): 3.1 CM^2
BH CV ECHO MEAS - LVOT AREA: 3.2 CM^2
BH CV ECHO MEAS - LVOT DIAM: 2 CM
BH CV ECHO MEAS - LVPWD: 1.5 CM
BH CV ECHO MEAS - MED PEAK E' VEL: 5.5 CM/SEC
BH CV ECHO MEAS - MEDIAL E/E' RATIO: 17.9
BH CV ECHO MEAS - MV A MAX VEL: 150.8 CM/SEC
BH CV ECHO MEAS - MV DEC SLOPE: 671.3 CM/SEC^2
BH CV ECHO MEAS - MV DEC TIME: 0.31 SEC
BH CV ECHO MEAS - MV E MAX VEL: 100.4 CM/SEC
BH CV ECHO MEAS - MV E/A: 0.67
BH CV ECHO MEAS - MV MAX PG: 11 MMHG
BH CV ECHO MEAS - MV MEAN PG: 3.5 MMHG
BH CV ECHO MEAS - MV P1/2T MAX VEL: 131.5 CM/SEC
BH CV ECHO MEAS - MV P1/2T: 57.4 MSEC
BH CV ECHO MEAS - MV V2 MAX: 166.1 CM/SEC
BH CV ECHO MEAS - MV V2 MEAN: 84.1 CM/SEC
BH CV ECHO MEAS - MV V2 VTI: 55.7 CM
BH CV ECHO MEAS - MVA P1/2T LCG: 1.7 CM^2
BH CV ECHO MEAS - MVA(P1/2T): 3.8 CM^2
BH CV ECHO MEAS - MVA(VTI): 2 CM^2
BH CV ECHO MEAS - PA ACC SLOPE: 928.5 CM/SEC^2
BH CV ECHO MEAS - PA ACC TIME: 0.12 SEC
BH CV ECHO MEAS - PA MAX PG: 6.5 MMHG
BH CV ECHO MEAS - PA PR(ACCEL): 24.3 MMHG
BH CV ECHO MEAS - PA V2 MAX: 127.1 CM/SEC
BH CV ECHO MEAS - PULM A REVS VEL: 36.5 CM/SEC
BH CV ECHO MEAS - PULM DIAS VEL: 39 CM/SEC
BH CV ECHO MEAS - PULM S/D: 1.4
BH CV ECHO MEAS - PULM SYS VEL: 56.3 CM/SEC
BH CV ECHO MEAS - RAP SYSTOLE: 8 MMHG
BH CV ECHO MEAS - RVSP: 31 MMHG
BH CV ECHO MEAS - SI(AO): 133.7 ML/M^2
BH CV ECHO MEAS - SI(CUBED): 20.1 ML/M^2
BH CV ECHO MEAS - SI(LVOT): 54.3 ML/M^2
BH CV ECHO MEAS - SI(MOD-SP2): 29.6 ML/M^2
BH CV ECHO MEAS - SI(MOD-SP4): 47.9 ML/M^2
BH CV ECHO MEAS - SI(TEICH): 18.7 ML/M^2
BH CV ECHO MEAS - SV(AO): 270.8 ML
BH CV ECHO MEAS - SV(CUBED): 40.7 ML
BH CV ECHO MEAS - SV(LVOT): 109.9 ML
BH CV ECHO MEAS - SV(MOD-SP2): 60 ML
BH CV ECHO MEAS - SV(MOD-SP4): 97 ML
BH CV ECHO MEAS - SV(TEICH): 37.9 ML
BH CV ECHO MEAS - TAPSE (>1.6): 1.9 CM
BH CV ECHO MEAS - TR MAX PG: 23 MMHG
BH CV ECHO MEAS - TR MAX VEL: 240 CM/SEC
BH CV ECHO MEASUREMENTS AVERAGE E/E' RATIO: 22.07
BH CV VAS BP LEFT ARM: NORMAL MMHG
BH CV XLRA - RV BASE: 4.2 CM
BH CV XLRA - RV LENGTH: 7.2 CM
BH CV XLRA - RV MID: 3.8 CM
BH CV XLRA - TDI S': 18.3 CM/SEC
LEFT ATRIUM VOLUME INDEX: 38 ML/M^2
LEFT ATRIUM VOLUME: 77 ML
LV EF 2D ECHO EST: 65 %

## 2021-11-09 ENCOUNTER — TELEPHONE (OUTPATIENT)
Dept: CARDIOLOGY | Facility: CLINIC | Age: 86
End: 2021-11-09

## 2021-11-09 NOTE — TELEPHONE ENCOUNTER
Spoke with son, advised of below    ----- Message from MIRIAM Llanos sent at 11/8/2021  4:53 PM EST -----  Please let patient know echo is ok. No valve disease that is contributing to edema. Normal heart function

## 2021-12-29 RX ORDER — METOPROLOL SUCCINATE 25 MG/1
TABLET, EXTENDED RELEASE ORAL
Qty: 90 TABLET | Refills: 0 | Status: SHIPPED | OUTPATIENT
Start: 2021-12-29 | End: 2022-03-31

## 2022-03-31 RX ORDER — METOPROLOL SUCCINATE 25 MG/1
TABLET, EXTENDED RELEASE ORAL
Qty: 90 TABLET | Refills: 0 | Status: SHIPPED | OUTPATIENT
Start: 2022-03-31 | End: 2022-07-07

## 2022-07-07 RX ORDER — METOPROLOL SUCCINATE 25 MG/1
TABLET, EXTENDED RELEASE ORAL
Qty: 90 TABLET | Refills: 0 | Status: SHIPPED | OUTPATIENT
Start: 2022-07-07 | End: 2022-10-05

## 2022-08-12 RX ORDER — RIVAROXABAN 15 MG/1
TABLET, FILM COATED ORAL
Qty: 30 TABLET | Refills: 5 | Status: SHIPPED | OUTPATIENT
Start: 2022-08-12 | End: 2023-02-06

## 2022-10-05 ENCOUNTER — TELEPHONE (OUTPATIENT)
Dept: CARDIOLOGY | Facility: CLINIC | Age: 87
End: 2022-10-05

## 2022-10-05 RX ORDER — METOPROLOL SUCCINATE 25 MG/1
TABLET, EXTENDED RELEASE ORAL
Qty: 90 TABLET | Refills: 0 | Status: SHIPPED | OUTPATIENT
Start: 2022-10-05 | End: 2023-01-09

## 2022-10-05 NOTE — TELEPHONE ENCOUNTER
Son called to request sooner appt due to patient being seen at [facility] for afib.  Have requested records for Dr. Beebe to review and advise of time frame for appt.     Left VM advising son of above.

## 2022-10-06 ENCOUNTER — TELEPHONE (OUTPATIENT)
Dept: CARDIOLOGY | Facility: CLINIC | Age: 87
End: 2022-10-06

## 2022-10-06 NOTE — TELEPHONE ENCOUNTER
Spoke with son in response to VM advising patient had been to ER, records reviewed, they advise pulmonary referral, son states he doesn't think she needs that, he said her BP systolic was over 200 and she is in Afib and that is what is causing her shortness of breath.  He is not with patient currently but will call back with meds and BP at home.

## 2022-10-22 ENCOUNTER — HOSPITAL ENCOUNTER (EMERGENCY)
Facility: HOSPITAL | Age: 87
Discharge: HOME | End: 2022-10-22
Attending: EMERGENCY MEDICINE | Admitting: EMERGENCY MEDICINE

## 2022-10-22 ENCOUNTER — APPOINTMENT (OUTPATIENT)
Dept: GENERAL RADIOLOGY | Facility: HOSPITAL | Age: 87
End: 2022-10-22

## 2022-10-22 VITALS
SYSTOLIC BLOOD PRESSURE: 165 MMHG | BODY MASS INDEX: 25.62 KG/M2 | HEART RATE: 58 BPM | TEMPERATURE: 97.4 F | DIASTOLIC BLOOD PRESSURE: 69 MMHG | WEIGHT: 179 LBS | HEIGHT: 70 IN | OXYGEN SATURATION: 98 % | RESPIRATION RATE: 18 BRPM

## 2022-10-22 DIAGNOSIS — K21.00 GASTROESOPHAGEAL REFLUX DISEASE WITH ESOPHAGITIS WITHOUT HEMORRHAGE: ICD-10-CM

## 2022-10-22 DIAGNOSIS — R07.9 CHEST PAIN, UNSPECIFIED TYPE: Primary | ICD-10-CM

## 2022-10-22 LAB
ALBUMIN SERPL-MCNC: 4.1 G/DL (ref 3.5–5.2)
ALBUMIN/GLOB SERPL: 1.4 G/DL
ALP SERPL-CCNC: 76 U/L (ref 39–117)
ALT SERPL W P-5'-P-CCNC: 14 U/L (ref 1–33)
ANION GAP SERPL CALCULATED.3IONS-SCNC: 9 MMOL/L (ref 5–15)
AST SERPL-CCNC: 17 U/L (ref 1–32)
BASOPHILS # BLD AUTO: 0.08 10*3/MM3 (ref 0–0.2)
BASOPHILS NFR BLD AUTO: 0.9 % (ref 0–1.5)
BILIRUB SERPL-MCNC: 0.3 MG/DL (ref 0–1.2)
BUN SERPL-MCNC: 22 MG/DL (ref 8–23)
BUN/CREAT SERPL: 20.2 (ref 7–25)
CALCIUM SPEC-SCNC: 9.6 MG/DL (ref 8.6–10.5)
CHLORIDE SERPL-SCNC: 107 MMOL/L (ref 98–107)
CO2 SERPL-SCNC: 27 MMOL/L (ref 22–29)
CREAT SERPL-MCNC: 1.09 MG/DL (ref 0.57–1)
DEPRECATED RDW RBC AUTO: 48.6 FL (ref 37–54)
EGFRCR SERPLBLD CKD-EPI 2021: 48.7 ML/MIN/1.73
EOSINOPHIL # BLD AUTO: 0.22 10*3/MM3 (ref 0–0.4)
EOSINOPHIL NFR BLD AUTO: 2.4 % (ref 0.3–6.2)
ERYTHROCYTE [DISTWIDTH] IN BLOOD BY AUTOMATED COUNT: 13.3 % (ref 12.3–15.4)
GLOBULIN UR ELPH-MCNC: 3 GM/DL
GLUCOSE SERPL-MCNC: 126 MG/DL (ref 65–99)
HCT VFR BLD AUTO: 42.3 % (ref 34–46.6)
HGB BLD-MCNC: 13.4 G/DL (ref 12–15.9)
HOLD SPECIMEN: NORMAL
IMM GRANULOCYTES # BLD AUTO: 0.03 10*3/MM3 (ref 0–0.05)
IMM GRANULOCYTES NFR BLD AUTO: 0.3 % (ref 0–0.5)
LIPASE SERPL-CCNC: 30 U/L (ref 13–60)
LYMPHOCYTES # BLD AUTO: 1.76 10*3/MM3 (ref 0.7–3.1)
LYMPHOCYTES NFR BLD AUTO: 18.9 % (ref 19.6–45.3)
MCH RBC QN AUTO: 31.1 PG (ref 26.6–33)
MCHC RBC AUTO-ENTMCNC: 31.7 G/DL (ref 31.5–35.7)
MCV RBC AUTO: 98.1 FL (ref 79–97)
MONOCYTES # BLD AUTO: 1.03 10*3/MM3 (ref 0.1–0.9)
MONOCYTES NFR BLD AUTO: 11.1 % (ref 5–12)
NEUTROPHILS NFR BLD AUTO: 6.2 10*3/MM3 (ref 1.7–7)
NEUTROPHILS NFR BLD AUTO: 66.4 % (ref 42.7–76)
NRBC BLD AUTO-RTO: 0 /100 WBC (ref 0–0.2)
NT-PROBNP SERPL-MCNC: 387.4 PG/ML (ref 0–1800)
PLATELET # BLD AUTO: 254 10*3/MM3 (ref 140–450)
PMV BLD AUTO: 10.5 FL (ref 6–12)
POTASSIUM SERPL-SCNC: 3.9 MMOL/L (ref 3.5–5.2)
PROT SERPL-MCNC: 7.1 G/DL (ref 6–8.5)
RBC # BLD AUTO: 4.31 10*6/MM3 (ref 3.77–5.28)
SODIUM SERPL-SCNC: 143 MMOL/L (ref 136–145)
TROPONIN T SERPL-MCNC: 0.01 NG/ML (ref 0–0.03)
TROPONIN T SERPL-MCNC: 0.01 NG/ML (ref 0–0.03)
WBC NRBC COR # BLD: 9.32 10*3/MM3 (ref 3.4–10.8)
WHOLE BLOOD HOLD COAG: NORMAL
WHOLE BLOOD HOLD SPECIMEN: NORMAL

## 2022-10-22 PROCEDURE — 83880 ASSAY OF NATRIURETIC PEPTIDE: CPT | Performed by: EMERGENCY MEDICINE

## 2022-10-22 PROCEDURE — 36415 COLL VENOUS BLD VENIPUNCTURE: CPT

## 2022-10-22 PROCEDURE — 80053 COMPREHEN METABOLIC PANEL: CPT | Performed by: EMERGENCY MEDICINE

## 2022-10-22 PROCEDURE — 71045 X-RAY EXAM CHEST 1 VIEW: CPT

## 2022-10-22 PROCEDURE — 99284 EMERGENCY DEPT VISIT MOD MDM: CPT

## 2022-10-22 PROCEDURE — 84484 ASSAY OF TROPONIN QUANT: CPT | Performed by: EMERGENCY MEDICINE

## 2022-10-22 PROCEDURE — 85025 COMPLETE CBC W/AUTO DIFF WBC: CPT | Performed by: EMERGENCY MEDICINE

## 2022-10-22 PROCEDURE — 93005 ELECTROCARDIOGRAM TRACING: CPT | Performed by: EMERGENCY MEDICINE

## 2022-10-22 PROCEDURE — 83690 ASSAY OF LIPASE: CPT | Performed by: EMERGENCY MEDICINE

## 2022-10-22 RX ORDER — SODIUM CHLORIDE 0.9 % (FLUSH) 0.9 %
10 SYRINGE (ML) INJECTION AS NEEDED
Status: DISCONTINUED | OUTPATIENT
Start: 2022-10-22 | End: 2022-10-22 | Stop reason: HOSPADM

## 2022-10-22 RX ORDER — ASPIRIN 81 MG/1
324 TABLET, CHEWABLE ORAL ONCE
Status: COMPLETED | OUTPATIENT
Start: 2022-10-22 | End: 2022-10-22

## 2022-10-22 RX ADMIN — ASPIRIN 81 MG 324 MG: 81 TABLET ORAL at 09:52

## 2022-10-22 NOTE — ED PROVIDER NOTES
Subjective   History of Present Illness  89-year-old female who reports that while she was asleep she rolled over and began to have central chest pain at 11 PM last night.  She reports that it intermittently occur throughout the night.  She denies any pain at this Time.  She describes as a burning sensation in the center of the chest without radiation into the neck, shoulders, back, arms, or abdomen.  She does report a known previous history of GERD and reports that this felt somewhat similar.  She denies a known history of coronary artery disease.  She does have a history of high blood pressure high cholesterol.  No reported history of diabetes, smoking, or significant history of first-degree family numbers with known coronary artery disease.  She appears awake alert and nontoxic in appearance.  No reported change in bowel function.  No reported change in urination include no dysuria, frequency, urgency.  Did not take any medication prior to arrival.  No other acute complaints.        Review of Systems   Constitutional: Negative for chills, fatigue and fever.   HENT: Negative for congestion, ear pain, postnasal drip, sinus pressure and sore throat.    Eyes: Negative for pain, redness and visual disturbance.   Respiratory: Negative for cough, chest tightness and shortness of breath.    Cardiovascular: Positive for chest pain. Negative for palpitations and leg swelling.   Gastrointestinal: Negative for abdominal pain, anal bleeding, blood in stool, diarrhea, nausea and vomiting.   Endocrine: Negative for polydipsia and polyuria.   Genitourinary: Negative for difficulty urinating, dysuria, frequency and urgency.   Musculoskeletal: Negative for arthralgias, back pain and neck pain.   Skin: Negative for pallor and rash.   Allergic/Immunologic: Negative for environmental allergies and immunocompromised state.   Neurological: Negative for dizziness, weakness and headaches.   Hematological: Negative for adenopathy.    Psychiatric/Behavioral: Negative for confusion, self-injury and suicidal ideas. The patient is not nervous/anxious.    All other systems reviewed and are negative.      Past Medical History:   Diagnosis Date   • Abnormal heart rhythm    • Arthritis    • Atrial fibrillation (HCC)    • Chicken pox    • Hyperlipidemia    • Hypertension    • Measles    • Thyroid disorder        Allergies   Allergen Reactions   • Amiodarone Shortness Of Breath     Lung Damage       Past Surgical History:   Procedure Laterality Date   • CATARACT EXTRACTION     • HYSTERECTOMY         History reviewed. No pertinent family history.    Social History     Socioeconomic History   • Marital status: Single   Tobacco Use   • Smoking status: Never   • Smokeless tobacco: Never   Substance and Sexual Activity   • Alcohol use: No   • Drug use: No           Objective   Physical Exam  Vitals and nursing note reviewed.   Constitutional:       General: She is not in acute distress.     Appearance: Normal appearance. She is well-developed. She is not toxic-appearing or diaphoretic.   HENT:      Head: Normocephalic and atraumatic.      Right Ear: External ear normal.      Left Ear: External ear normal.      Nose: Nose normal.   Eyes:      General: Lids are normal.      Pupils: Pupils are equal, round, and reactive to light.   Neck:      Trachea: No tracheal deviation.   Cardiovascular:      Rate and Rhythm: Normal rate and regular rhythm.      Pulses: No decreased pulses.      Heart sounds: Normal heart sounds. No murmur heard.    No friction rub. No gallop.   Pulmonary:      Effort: Pulmonary effort is normal. No respiratory distress.      Breath sounds: Normal breath sounds. No decreased breath sounds, wheezing, rhonchi or rales.   Abdominal:      General: Bowel sounds are normal.      Palpations: Abdomen is soft.      Tenderness: There is no abdominal tenderness. There is no guarding or rebound.   Musculoskeletal:         General: No deformity. Normal  range of motion.      Cervical back: Normal range of motion and neck supple.   Lymphadenopathy:      Cervical: No cervical adenopathy.   Skin:     General: Skin is warm and dry.      Findings: No rash.   Neurological:      Mental Status: She is alert and oriented to person, place, and time.      Cranial Nerves: No cranial nerve deficit.      Sensory: No sensory deficit.   Psychiatric:         Speech: Speech normal.         Behavior: Behavior normal.         Thought Content: Thought content normal.         Judgment: Judgment normal.         Procedures           ED Course                                           MDM  Number of Diagnoses or Management Options  Chest pain, unspecified type: new and requires workup  Gastroesophageal reflux disease with esophagitis without hemorrhage: new and requires workup  Diagnosis management comments: HEART Score for Major Cardiac Events - MDCalc  Calculated on Oct 22 2022 12:33 PM  3 points -> Low Score (0-3 points) Risk of MACE of 0.9-1.7%.    Serial cardiac enzymes and troponins do not show any acute ischemic changes.    The patient has denied any chest pain throughout the times I have seen her here in the ER.    She reports rolling over from sleep and had the chest pain that occurred intermittently and was described as a burning sensation up the center of the chest.       Amount and/or Complexity of Data Reviewed  Clinical lab tests: ordered and reviewed  Tests in the radiology section of CPT®: ordered and reviewed  Obtain history from someone other than the patient: yes  Review and summarize past medical records: yes  Independent visualization of images, tracings, or specimens: yes        Final diagnoses:   Chest pain, unspecified type   Gastroesophageal reflux disease with esophagitis without hemorrhage       ED Disposition  ED Disposition     ED Disposition   Discharge    Condition   Stable    Comment   --             No follow-up provider specified.       Medication List       No changes were made to your prescriptions during this visit.          Nenita Adams MD  10/22/22 [id number]

## 2022-10-25 LAB
QT INTERVAL: 428 MS
QTC INTERVAL: 398 MS

## 2022-11-02 ENCOUNTER — OFFICE VISIT (OUTPATIENT)
Dept: CARDIOLOGY | Facility: CLINIC | Age: 87
End: 2022-11-02

## 2022-11-02 VITALS
SYSTOLIC BLOOD PRESSURE: 170 MMHG | DIASTOLIC BLOOD PRESSURE: 68 MMHG | HEIGHT: 69 IN | OXYGEN SATURATION: 95 % | HEART RATE: 60 BPM | BODY MASS INDEX: 27.7 KG/M2 | WEIGHT: 187 LBS

## 2022-11-02 DIAGNOSIS — I10 ESSENTIAL HYPERTENSION: ICD-10-CM

## 2022-11-02 DIAGNOSIS — E78.00 PURE HYPERCHOLESTEROLEMIA: ICD-10-CM

## 2022-11-02 DIAGNOSIS — I48.0 PAROXYSMAL ATRIAL FIBRILLATION: Primary | ICD-10-CM

## 2022-11-02 PROCEDURE — 99214 OFFICE O/P EST MOD 30 MIN: CPT | Performed by: INTERNAL MEDICINE

## 2022-11-02 RX ORDER — AMLODIPINE BESYLATE 10 MG/1
10 TABLET ORAL DAILY
Qty: 30 TABLET | Refills: 11 | Status: SHIPPED | OUTPATIENT
Start: 2022-11-02

## 2022-11-02 RX ORDER — TROSPIUM CHLORIDE 20 MG/1
20 TABLET, FILM COATED ORAL
COMMUNITY
Start: 2022-02-04 | End: 2023-02-04

## 2022-11-02 NOTE — PROGRESS NOTES
Subjective:     Encounter Date:11/02/2022    Primary Care Physician: Rafia Keenan MD      Patient ID: Carolyn Pina is a 89 y.o. female.    Chief Complaint:Follow-up    PROBLEM LIST:  1. Paroxysmal atrial fibrillation  a. Diagnosed in 2016  b. CHADSVASC- 4, on chronic anticoagulation  c. Amiodarone pulmonary toxicity  d. 11/4/2021 echo EF 65%.  Mild to moderate AR.  2. Hypertension  3. Dyslipidemia  4. 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 On nocturnal oxygen  5. Hypothyroidism  6. Rheumatoid arthritis  7. Surgeries:  a. Total hysterectomy  b. Bilateral cataract extraction  c. Neck lipoma removal.      Allergies   Allergen Reactions   • Amiodarone Shortness Of Breath     Lung Damage         Current Outpatient Medications:   •  bumetanide (BUMEX) 2 MG tablet, Take 2 mg by mouth Daily., Disp: , Rfl:   •  levothyroxine (SYNTHROID, LEVOTHROID) 75 MCG tablet, Take 75 mcg by mouth Daily., Disp: , Rfl:   •  levothyroxine (SYNTHROID, LEVOTHROID) 75 MCG tablet, Take 75 mcg by mouth Daily., Disp: , Rfl:   •  LONHALA MAGNAIR REFILL KIT 25 MCG/ML solution, 2 (Two) Times a Day. as directed, Disp: , Rfl: 12  •  metoprolol succinate XL (TOPROL-XL) 25 MG 24 hr tablet, Take 1 tablet by mouth once daily, Disp: 90 tablet, Rfl: 0  •  MIRTAZAPINE PO, Take  by mouth As Needed., Disp: , Rfl:   •  nitroglycerin (NITROSTAT) 0.4 MG SL tablet, Place 0.4 mg under the tongue Every 5 (Five) Minutes As Needed. do not exceed a total of 3 doses in 15 minutes, Disp: , Rfl: 0  •  omeprazole (priLOSEC) 20 MG capsule, Take 20 mg by mouth Daily., Disp: , Rfl:   •  predniSONE (DELTASONE) 5 MG tablet, Take 5 mg by mouth Daily., Disp: , Rfl:   •  rosuvastatin (CRESTOR) 10 MG tablet, Take 10 mg by mouth Daily., Disp: , Rfl:   •  trospium (SANCTURA) 20 MG tablet, Take 1 tablet by mouth., Disp: , Rfl:   •  Xarelto 15 MG tablet, TAKE 1 TABLET BY MOUTH ONCE DAILY WITH  DINNER, Disp: 30 tablet, Rfl: 5  •  metOLazone (ZAROXOLYN) 2.5 MG  "tablet, Take 1 tablet by mouth Daily., Disp: 2 tablet, Rfl: 0        History of Present Illness    Patient returns today for annual follow-up of atrial fibrillation.  Patient and son note that over the last 2 months they have had 2 emergency room visits for tachypalpitations presumed A. fib.  Both times she was fine, developed abrupt onset of rapid tach heart rates, heart rate monitor at home says she was in A. fib with rates greater than 160.  Drove to the emergency room, and both times upon settling and \"resting\", but spontaneously recurred and she was in sinus rhythm.  No change with exertional status.  Notes her blood pressures at home of been running systolic ranging 1 50-1 90 consistently.  No orthopnea PND.  Much less lower extremity edema.    The following portions of the patient's history were reviewed and updated as appropriate: allergies, current medications, past family history, past medical history, past social history, past surgical history and problem list.      Social History     Tobacco Use   • Smoking status: Never   • Smokeless tobacco: Never   Substance Use Topics   • Alcohol use: No   • Drug use: No         ROS       Objective:   /68   Pulse 60   Ht 175.3 cm (69\")   Wt 84.8 kg (187 lb)   SpO2 95%   BMI 27.62 kg/m²         Vitals reviewed.   Constitutional:       Appearance: Well-developed and not in distress. Frail.      Comments: In wheelchair   Neck:      Vascular: No JVD.      Trachea: No tracheal deviation.   Pulmonary:      Effort: Pulmonary effort is normal.      Breath sounds: Normal breath sounds.   Cardiovascular:      Normal rate.      Comments: Bilateral venous varicosities  Edema:     Pretibial: 1+ edema of the left pretibial area and trace edema of the right pretibial area.  Abdominal:      General: Bowel sounds are normal.      Palpations: Abdomen is soft.      Tenderness: There is no abdominal tenderness.   Musculoskeletal:         General: No deformity. Skin:     " General: Skin is warm and dry.   Neurological:      Mental Status: Alert and oriented to person, place, and time.         Procedures          Assessment:   Assessment & Plan      Diagnoses and all orders for this visit:    1. Paroxysmal atrial fibrillation (HCC) (Primary)    2. Essential hypertension    3. Pure hypercholesterolemia      1.  Paroxysmal atrial relation.  2 recurrent events over the last 2 months likely driven by hypertension.  Failed amiodarone.  2.  History of interstitial lung disease due to amiodarone toxicity.  3.  Hypertension, uncontrolled at this time.    Recommendations:  1.  Discussed other antiarrhythmics at this point I do not think she will tolerate them from a side effect profile.  2.  We will control her blood pressure more aggressively with hopes of controlling her A. fib events.  We will start amlodipine 5 mg daily in addition to her current antihypertensives.  2.  Follow-up with primary care physician for further blood pressure titration in 2 to 3 weeks.  4.  Discussed if she has A. fib that is brief in duration, she does not need to seek medical attention unless it is prolonged or highly symptomatic.  5.  Continue anticoagulation  6.  Revisit annually.  Symptom check     Sigifredo Beebe MD        Dictated utilizing Dragon dictation

## 2022-12-05 ENCOUNTER — TELEPHONE (OUTPATIENT)
Dept: CARDIOLOGY | Facility: CLINIC | Age: 87
End: 2022-12-05

## 2022-12-05 RX ORDER — LOSARTAN POTASSIUM 50 MG/1
50 TABLET ORAL DAILY
Qty: 90 TABLET | Refills: 0 | Status: SHIPPED | OUTPATIENT
Start: 2022-12-05 | End: 2023-03-21

## 2022-12-05 NOTE — TELEPHONE ENCOUNTER
Patient's son called to report that since starting Amlodipine, her feet and ankles have been more swollen. She has stopped the medication and he would like to know if she could try something else.     Currently takes:  Metoprolol Succinate 25mg daily  Metolazone 2.5mg daily

## 2022-12-05 NOTE — TELEPHONE ENCOUNTER
"Per Polly Gomez APRN, \"start Losartan 50mg daily\". Notified patient. They will let us know if there are any issues. Order placed.  "

## 2023-01-09 RX ORDER — METOPROLOL SUCCINATE 25 MG/1
TABLET, EXTENDED RELEASE ORAL
Qty: 90 TABLET | Refills: 0 | Status: SHIPPED | OUTPATIENT
Start: 2023-01-09 | End: 2023-01-19

## 2023-01-19 RX ORDER — METOPROLOL SUCCINATE 25 MG/1
TABLET, EXTENDED RELEASE ORAL
Qty: 90 TABLET | Refills: 3 | Status: SHIPPED | OUTPATIENT
Start: 2023-01-19 | End: 2023-04-03 | Stop reason: SDUPTHER

## 2023-02-06 RX ORDER — RIVAROXABAN 15 MG/1
TABLET, FILM COATED ORAL
Qty: 30 TABLET | Refills: 11 | Status: SHIPPED | OUTPATIENT
Start: 2023-02-06

## 2023-03-21 RX ORDER — LOSARTAN POTASSIUM 50 MG/1
TABLET ORAL
Qty: 90 TABLET | Refills: 3 | Status: SHIPPED | OUTPATIENT
Start: 2023-03-21

## 2023-04-03 RX ORDER — METOPROLOL SUCCINATE 25 MG/1
25 TABLET, EXTENDED RELEASE ORAL DAILY
Qty: 90 TABLET | Refills: 3 | Status: SHIPPED | OUTPATIENT
Start: 2023-04-03

## 2023-08-14 ENCOUNTER — TRANSCRIBE ORDERS (OUTPATIENT)
Dept: ADMINISTRATIVE | Facility: HOSPITAL | Age: 88
End: 2023-08-14
Payer: MEDICARE

## 2023-08-14 ENCOUNTER — HOSPITAL ENCOUNTER (OUTPATIENT)
Dept: GENERAL RADIOLOGY | Facility: HOSPITAL | Age: 88
Discharge: HOME | End: 2023-08-14
Admitting: FAMILY MEDICINE
Payer: MEDICARE

## 2023-08-14 DIAGNOSIS — R06.01 ORTHOPNEA: Primary | ICD-10-CM

## 2023-08-14 PROCEDURE — 71046 X-RAY EXAM CHEST 2 VIEWS: CPT

## 2023-09-07 RX ORDER — AMLODIPINE BESYLATE 10 MG/1
10 TABLET ORAL DAILY
Qty: 30 TABLET | Refills: 11 | Status: SHIPPED | OUTPATIENT
Start: 2023-09-07

## 2024-02-05 NOTE — PROGRESS NOTES
Subjective:     Encounter Date:02/07/2024    Primary Care Physician: Rafia Keenan MD      Patient ID: Carolyn Pina is a 89 y.o. female.    Chief Complaint:Atrial Fibrillation    PROBLEM LIST:  Paroxysmal atrial fibrillation  Diagnosed in 2016  CHADSVASC- 4, on chronic anticoagulation  Amiodarone pulmonary toxicity  11/4/2021 echo EF 65%.  Mild to moderate AR.  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      Allergies   Allergen Reactions    Amiodarone Shortness Of Breath     Lung Damage         Current Outpatient Medications:     amLODIPine (NORVASC) 5 MG tablet, Take 1 tablet by mouth Daily., Disp: 90 tablet, Rfl: 3    bumetanide (BUMEX) 2 MG tablet, Take 1 tablet by mouth Daily., Disp: , Rfl:     levothyroxine (SYNTHROID, LEVOTHROID) 75 MCG tablet, Take 1 tablet by mouth Daily., Disp: , Rfl:     LONHALA MAGNAIR REFILL KIT 25 MCG/ML solution, 2 (Two) Times a Day. as directed, Disp: , Rfl: 12    metoprolol succinate XL (TOPROL-XL) 25 MG 24 hr tablet, Take 1 tablet by mouth Daily., Disp: 90 tablet, Rfl: 3    nitroglycerin (NITROSTAT) 0.4 MG SL tablet, Place 1 tablet under the tongue Every 5 (Five) Minutes As Needed. do not exceed a total of 3 doses in 15 minutes, Disp: , Rfl: 0    omeprazole (priLOSEC) 20 MG capsule, Take 2 capsules by mouth Daily., Disp: , Rfl:     predniSONE (DELTASONE) 5 MG tablet, Take 1 tablet by mouth Daily., Disp: , Rfl:     rosuvastatin (CRESTOR) 10 MG tablet, Take 1 tablet by mouth Daily., Disp: , Rfl:     trospium (SANCTURA) 20 MG tablet, Take 1 tablet by mouth 2 (Two) Times a Day., Disp: , Rfl:     Xarelto 15 MG tablet, TAKE 1 TABLET BY MOUTH ONCE DAILY WITH SUPPER, Disp: 30 tablet, Rfl: 11        History of Present Illness    Patient is a 89-year-old  female who presents  "today for annual follow-up of paroxysmal atrial fibrillation.  Since last being seen patient notes overall doing well from cardiac standpoint.  She denies any chest pain, pressure, tightness.  Denies any increasing shortness of breath.  Continues to use oxygen at night.  No reported syncope, presyncope.  She contacted our office regarding some lower extremity edema and her amlodipine was decreased.  Notes that this has helped somewhat.  Has significant varicose veins.  Edema is typically improved in the mornings and worsens when she lowers her feet.    The following portions of the patient's history were reviewed and updated as appropriate: allergies, current medications, past family history, past medical history, past social history, past surgical history and problem list.      Social History     Tobacco Use    Smoking status: Never    Smokeless tobacco: Never   Substance Use Topics    Alcohol use: No    Drug use: No         ROS       Objective:   /75   Pulse 62   Ht 175.3 cm (69\")   Wt 86.9 kg (191 lb 9.6 oz)   SpO2 96%   BMI 28.29 kg/m²         Vitals reviewed.   Constitutional:       Appearance: Well-developed and not in distress.      Comments: Elderly female walking with the assistance of a cane   Neck:      Vascular: No JVD.      Trachea: No tracheal deviation.   Pulmonary:      Effort: Pulmonary effort is normal.      Breath sounds: Normal breath sounds.   Cardiovascular:      Normal rate. Irregularly irregular rhythm.      Murmurs: There is no murmur.   Edema:     Peripheral edema absent.   Musculoskeletal:         General: No deformity. Skin:     General: Skin is warm and dry.   Neurological:      Mental Status: Alert and oriented to person, place, and time.         Procedures          Assessment:   Assessment & Plan      Diagnoses and all orders for this visit:    1. Paroxysmal atrial fibrillation (Primary), stable.  Irregular rhythm today but overall rate controlled and on anticoagulation.  " Asymptomatic.    2. Essential hypertension, controlled.  On amlodipine.    3. Pure hypercholesterolemia, stable.  On statin.  Labs with primary care.    Other orders  -     amLODIPine (NORVASC) 5 MG tablet; Take 1 tablet by mouth Daily.  Dispense: 90 tablet; Refill: 3      Plan:  Patient is overall stable from cardiac standpoint.  Discussed with patient and family likely recurrence of atrial fibrillation on exam.  EKG was deferred.  Patient overall rate controlled, asymptomatic and anticoagulated.  Continue current cardiac medications.  Follow-up in 1 years time or sooner if needed.     MIRIAM Llanos     Advance Care Planning   ACP discussion was held with the patient during this visit. Patient has an advance directive in EMR which is still valid.         Dictated utilizing Dragon dictation

## 2024-02-07 ENCOUNTER — OFFICE VISIT (OUTPATIENT)
Dept: CARDIOLOGY | Facility: CLINIC | Age: 89
End: 2024-02-07
Payer: MEDICARE

## 2024-02-07 VITALS
WEIGHT: 191.6 LBS | BODY MASS INDEX: 28.38 KG/M2 | SYSTOLIC BLOOD PRESSURE: 125 MMHG | HEIGHT: 69 IN | OXYGEN SATURATION: 96 % | HEART RATE: 62 BPM | DIASTOLIC BLOOD PRESSURE: 75 MMHG

## 2024-02-07 DIAGNOSIS — E78.00 PURE HYPERCHOLESTEROLEMIA: ICD-10-CM

## 2024-02-07 DIAGNOSIS — I10 ESSENTIAL HYPERTENSION: ICD-10-CM

## 2024-02-07 DIAGNOSIS — I48.0 PAROXYSMAL ATRIAL FIBRILLATION: Primary | ICD-10-CM

## 2024-02-07 PROCEDURE — 1159F MED LIST DOCD IN RCRD: CPT | Performed by: NURSE PRACTITIONER

## 2024-02-07 PROCEDURE — 99214 OFFICE O/P EST MOD 30 MIN: CPT | Performed by: NURSE PRACTITIONER

## 2024-02-07 PROCEDURE — 1160F RVW MEDS BY RX/DR IN RCRD: CPT | Performed by: NURSE PRACTITIONER

## 2024-02-07 RX ORDER — TROSPIUM CHLORIDE 20 MG/1
1 TABLET, FILM COATED ORAL 2 TIMES DAILY
COMMUNITY

## 2024-02-07 RX ORDER — AMLODIPINE BESYLATE 5 MG/1
5 TABLET ORAL DAILY
Qty: 90 TABLET | Refills: 3 | Status: SHIPPED | OUTPATIENT
Start: 2024-02-07

## 2024-02-07 NOTE — LETTER
February 7, 2024       No Recipients    Patient: Carolyn Pina   YOB: 1933   Date of Visit: 2/7/2024     Dear Rafia Keenan MD:       Thank you for referring Carolyn Pina to me for evaluation. Below are the relevant portions of my assessment and plan of care.    If you have questions, please do not hesitate to call me. I look forward to following Carolyn along with you.         Sincerely,        MIRIAM Llanos        CC:   No Recipients    Polly Gomez APRN  02/07/24 1356  Sign when Signing Visit  Subjective:     Encounter Date:02/07/2024    Primary Care Physician: Rafia Keenan MD      Patient ID: Carolyn Pina is a 89 y.o. female.    Chief Complaint:Atrial Fibrillation    PROBLEM LIST:  Paroxysmal atrial fibrillation  Diagnosed in 2016  CHADSVASC- 4, on chronic anticoagulation  Amiodarone pulmonary toxicity  11/4/2021 echo EF 65%.  Mild to moderate AR.  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      Allergies   Allergen Reactions   • Amiodarone Shortness Of Breath     Lung Damage         Current Outpatient Medications:   •  amLODIPine (NORVASC) 5 MG tablet, Take 1 tablet by mouth Daily., Disp: 90 tablet, Rfl: 3  •  bumetanide (BUMEX) 2 MG tablet, Take 1 tablet by mouth Daily., Disp: , Rfl:   •  levothyroxine (SYNTHROID, LEVOTHROID) 75 MCG tablet, Take 1 tablet by mouth Daily., Disp: , Rfl:   •  LONHALA MAGNAIR REFILL KIT 25 MCG/ML solution, 2 (Two) Times a Day. as directed, Disp: , Rfl: 12  •  metoprolol succinate XL (TOPROL-XL) 25 MG 24 hr tablet, Take 1 tablet by mouth Daily., Disp: 90 tablet, Rfl: 3  •  nitroglycerin (NITROSTAT) 0.4 MG SL tablet, Place 1 tablet under the tongue Every 5 (Five) Minutes As Needed. do not exceed a total of 3 doses in 15 minutes, Disp: , Rfl: 0  •   "omeprazole (priLOSEC) 20 MG capsule, Take 2 capsules by mouth Daily., Disp: , Rfl:   •  predniSONE (DELTASONE) 5 MG tablet, Take 1 tablet by mouth Daily., Disp: , Rfl:   •  rosuvastatin (CRESTOR) 10 MG tablet, Take 1 tablet by mouth Daily., Disp: , Rfl:   •  trospium (SANCTURA) 20 MG tablet, Take 1 tablet by mouth 2 (Two) Times a Day., Disp: , Rfl:   •  Xarelto 15 MG tablet, TAKE 1 TABLET BY MOUTH ONCE DAILY WITH SUPPER, Disp: 30 tablet, Rfl: 11        History of Present Illness    Patient is a 89-year-old  female who presents today for annual follow-up of paroxysmal atrial fibrillation.  Since last being seen patient notes overall doing well from cardiac standpoint.  She denies any chest pain, pressure, tightness.  Denies any increasing shortness of breath.  Continues to use oxygen at night.  No reported syncope, presyncope.  She contacted our office regarding some lower extremity edema and her amlodipine was decreased.  Notes that this has helped somewhat.  Has significant varicose veins.  Edema is typically improved in the mornings and worsens when she lowers her feet.    The following portions of the patient's history were reviewed and updated as appropriate: allergies, current medications, past family history, past medical history, past social history, past surgical history and problem list.      Social History     Tobacco Use   • Smoking status: Never   • Smokeless tobacco: Never   Substance Use Topics   • Alcohol use: No   • Drug use: No         ROS       Objective:   /75   Pulse 62   Ht 175.3 cm (69\")   Wt 86.9 kg (191 lb 9.6 oz)   SpO2 96%   BMI 28.29 kg/m²         Vitals reviewed.   Constitutional:       Appearance: Well-developed and not in distress.      Comments: Elderly female walking with the assistance of a cane   Neck:      Vascular: No JVD.      Trachea: No tracheal deviation.   Pulmonary:      Effort: Pulmonary effort is normal.      Breath sounds: Normal breath sounds. "   Cardiovascular:      Normal rate. Irregularly irregular rhythm.      Murmurs: There is no murmur.   Edema:     Peripheral edema absent.   Musculoskeletal:         General: No deformity. Skin:     General: Skin is warm and dry.   Neurological:      Mental Status: Alert and oriented to person, place, and time.         Procedures          Assessment:   Assessment & Plan     Diagnoses and all orders for this visit:    1. Paroxysmal atrial fibrillation (Primary), stable.  Irregular rhythm today but overall rate controlled and on anticoagulation.  Asymptomatic.    2. Essential hypertension, controlled.  On amlodipine.    3. Pure hypercholesterolemia, stable.  On statin.  Labs with primary care.    Other orders  -     amLODIPine (NORVASC) 5 MG tablet; Take 1 tablet by mouth Daily.  Dispense: 90 tablet; Refill: 3      Plan:  Patient is overall stable from cardiac standpoint.  Discussed with patient and family likely recurrence of atrial fibrillation on exam.  EKG was deferred.  Patient overall rate controlled, asymptomatic and anticoagulated.  Continue current cardiac medications.  Follow-up in 1 years time or sooner if needed.     MIRIAM Llanos     Advance Care Planning  ACP discussion was held with the patient during this visit. Patient has an advance directive in EMR which is still valid.         Dictated utilizing Dragon dictation

## 2024-03-20 RX ORDER — METOPROLOL SUCCINATE 25 MG/1
25 TABLET, EXTENDED RELEASE ORAL DAILY
Qty: 90 TABLET | Refills: 3 | Status: SHIPPED | OUTPATIENT
Start: 2024-03-20

## 2025-02-17 RX ORDER — AMLODIPINE BESYLATE 5 MG/1
5 TABLET ORAL DAILY
Qty: 90 TABLET | Refills: 0 | Status: SHIPPED | OUTPATIENT
Start: 2025-02-17

## 2025-02-25 NOTE — PROGRESS NOTES
Subjective:     Encounter Date:02/26/2025    Primary Care Physician: Rafia Keenan MD      Patient ID: Carolyn Pina is a 89 y.o. female.    Chief Complaint:Follow-up    PROBLEM LIST:  Paroxysmal atrial fibrillation  Diagnosed in 2016  CHADSVASC- 4, on chronic anticoagulation  Amiodarone pulmonary toxicity  11/4/2021 echo EF 65%.  Mild to moderate AR.  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      Allergies   Allergen Reactions    Amiodarone Shortness Of Breath     Lung Damage         Current Outpatient Medications:     amLODIPine (NORVASC) 5 MG tablet, Take 1 tablet by mouth once daily, Disp: 90 tablet, Rfl: 0    bumetanide (BUMEX) 2 MG tablet, Take 1 tablet by mouth Daily., Disp: , Rfl:     levothyroxine (SYNTHROID, LEVOTHROID) 75 MCG tablet, Take 1 tablet by mouth Daily., Disp: , Rfl:     LONHALA MAGNAIR REFILL KIT 25 MCG/ML solution, 2 (Two) Times a Day. as directed, Disp: , Rfl: 12    metoprolol succinate XL (TOPROL-XL) 25 MG 24 hr tablet, Take 1 tablet by mouth once daily, Disp: 90 tablet, Rfl: 3    nitroglycerin (NITROSTAT) 0.4 MG SL tablet, Place 1 tablet under the tongue Every 5 (Five) Minutes As Needed. do not exceed a total of 3 doses in 15 minutes, Disp: , Rfl: 0    omeprazole (priLOSEC) 20 MG capsule, Take 2 capsules by mouth Daily., Disp: , Rfl:     predniSONE (DELTASONE) 5 MG tablet, Take 1 tablet by mouth Daily., Disp: , Rfl:     rosuvastatin (CRESTOR) 10 MG tablet, Take 1 tablet by mouth Daily., Disp: , Rfl:     Xarelto 15 MG tablet, TAKE 1 TABLET BY MOUTH ONCE DAILY WITH SUPPER, Disp: 30 tablet, Rfl: 11    trospium (SANCTURA) 20 MG tablet, Take 1 tablet by mouth 2 (Two) Times a Day., Disp: , Rfl:         History of Present Illness    [facility]  Since her last visit, she overall is unchanged.  She has been on recent antibiotics due to recurrent UTI.  No change in her lower extremity edema.  No change in dyspnea.  Somewhat limited due to arthritis and now walking with the aid of a walker.    The following portions of the patient's history were reviewed and updated as appropriate: allergies, current medications, past family history, past medical history, past social history, past surgical history and problem list.      Social History     Tobacco Use    Smoking status: Never    Smokeless tobacco: Never   Substance Use Topics    Alcohol use: No    Drug use: No         ROS       Objective:   /75   Pulse 74   Ht 175.3 cm (69\")   Wt 87.7 kg (193 lb 6.4 oz)   BMI 28.56 kg/m²         Vitals reviewed.   Constitutional:       Appearance: Well-developed and not in distress.   Neck:      Thyroid: No thyromegaly.      Vascular: No carotid bruit or JVD.   Pulmonary:      Breath sounds: Normal breath sounds.   Cardiovascular:      Regular rhythm.      No gallop. No S3 and S4 gallop.   Pulses:     Intact distal pulses.      Carotid: 2+ bilaterally.     Radial: 2+ bilaterally.  Edema:     Peripheral edema absent.   Abdominal:      General: Bowel sounds are normal.      Palpations: Abdomen is soft. There is no abdominal mass.      Tenderness: There is no abdominal tenderness.   Musculoskeletal:         General: No deformity.      Extremities: No clubbing present.Skin:     General: Skin is warm and dry.      Findings: No rash.   Neurological:      Mental Status: Alert and oriented to person, place, and time.         Procedures          Assessment:   Assessment & Plan      Diagnoses and all orders for this visit:    1. Paroxysmal atrial fibrillation (Primary)      1.  Paroxysmal atrial fibrillation.  Chronically anticoagulated.  Asymptomatic.  2.  Hypertension well-controlled  3.  Dyslipidemia on moderate intensity statin " last LDL at goal  4.  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  Lower extremity edema, venous insufficiency.  Stable    Recommendations: 1.  Discussed nonpharmacological maneuvers for her lower extremity edema.  2.  Will continue current medical therapy including lifelong anticoagulation  3.  Revisit as needed.     Sigifredo Beebe MD      Advance Care Planning   ACP discussion was held with the patient during this visit. Patient has an advance directive (not in EMR), copy requested.        Dictated utilizing Dragon dictation

## 2025-02-26 ENCOUNTER — OFFICE VISIT (OUTPATIENT)
Dept: CARDIOLOGY | Facility: CLINIC | Age: OVER 89
End: 2025-02-26
Payer: MEDICARE

## 2025-02-26 VITALS
DIASTOLIC BLOOD PRESSURE: 75 MMHG | HEIGHT: 69 IN | HEART RATE: 74 BPM | SYSTOLIC BLOOD PRESSURE: 164 MMHG | WEIGHT: 193.4 LBS | BODY MASS INDEX: 28.64 KG/M2

## 2025-02-26 DIAGNOSIS — I48.0 PAROXYSMAL ATRIAL FIBRILLATION: Primary | ICD-10-CM

## 2025-02-26 PROCEDURE — 99213 OFFICE O/P EST LOW 20 MIN: CPT | Performed by: INTERNAL MEDICINE

## 2025-02-26 PROCEDURE — 1160F RVW MEDS BY RX/DR IN RCRD: CPT | Performed by: INTERNAL MEDICINE

## 2025-02-26 PROCEDURE — 1159F MED LIST DOCD IN RCRD: CPT | Performed by: INTERNAL MEDICINE

## 2025-03-21 RX ORDER — METOPROLOL SUCCINATE 25 MG/1
25 TABLET, EXTENDED RELEASE ORAL DAILY
Qty: 90 TABLET | Refills: 3 | Status: SHIPPED | OUTPATIENT
Start: 2025-03-21

## 2025-04-23 ENCOUNTER — TELEPHONE (OUTPATIENT)
Dept: CARDIOLOGY | Facility: CLINIC | Age: OVER 89
End: 2025-04-23
Payer: MEDICARE

## 2025-04-23 RX ORDER — VALSARTAN 80 MG/1
80 TABLET ORAL DAILY
Qty: 30 TABLET | Refills: 11 | Status: SHIPPED | OUTPATIENT
Start: 2025-04-23

## 2025-04-23 NOTE — TELEPHONE ENCOUNTER
Spoke with , advised we recommend changing amlodipine to Diovan 80 mg daily.  Understanding and agreement verbalized.

## 2025-04-23 NOTE — TELEPHONE ENCOUNTER
called to report that patients lower extremity edema is worse with amlodipine and asks for an alternative.

## 2025-06-04 RX ORDER — VALSARTAN 80 MG/1
80 TABLET ORAL DAILY
Qty: 30 TABLET | Refills: 11 | Status: SHIPPED | OUTPATIENT
Start: 2025-06-04

## 2025-06-04 NOTE — TELEPHONE ENCOUNTER
Caller: Ernesto Pina    Relationship: Emergency Contact    Best call back number: [phone]    Requested Prescriptions:   Requested Prescriptions     Pending Prescriptions Disp Refills    valsartan (DIOVAN) 80 MG tablet 30 tablet 11     Sig: Take 1 tablet by mouth Daily.        Pharmacy where request should be sent: [facility] [address] [place] [address] [phone] [phone] [facility]     Last office visit with prescribing clinician: 2/26/2025   Last telemedicine visit with prescribing clinician: Visit date not found   Next office visit with prescribing clinician: Visit date not found     Additional details provided by patient: 1 WEEK REMAINING, WOULD LIKE 90 DAY SUPPLY SENT IN IF POSSIBLE    Does the patient have less than a 3 day supply:  [] Yes  [x] No    Would you like a call back once the refill request has been completed: [] Yes [x] No    If the office needs to give you a call back, can they leave a voicemail: [] Yes [x] No    Kristen Mccall Rep   06/04/25 10:32 EDT